# Patient Record
(demographics unavailable — no encounter records)

---

## 2025-01-14 NOTE — PHYSICAL EXAM
[Chaperone Present] : A chaperone was present in the examining room during all aspects of the physical examination [71143] : A chaperone was present during the pelvic exam. [FreeTextEntry2] : Roseline [Normal] : Anus and perineum: Normal sphincter tone, no masses, no prolapse. [Fully active, able to carry on all pre-disease performance without restriction] : Status 0 - Fully active, able to carry on all pre-disease performance without restriction

## 2025-01-14 NOTE — PROCEDURE
[Colposcopy] : colposcopy [ASCUS] : ASCUS [HPV high risk] : PCR positive for high risk HPV [Patient] : the patient [Verbal Consent] : verbal consent was obtained prior to the procedure and is detailed in the patient's record [Site Verification] : site verification performed. [Time Out] : Time Out Procedure:The following was confirmed prior to the procedure-Correct patient identity, correct site, agreement on the procedure to be done and correct patient position. [Allergies Reviewed] : Allergies reviewed [SCJ Fully Visualized] : the squamocolumnar junction was not fully visualized [No Abnormalities] : no abnormalities seen [ECC Done] : an Endocervical curettage was performed.  [No Complications] : none [Tolerated Well] : the patient tolerated the procedure well [Post procedure instructions and information given] : post procedure instructions and information given and reviewed with patient.

## 2025-01-14 NOTE — HISTORY OF PRESENT ILLNESS
[FreeTextEntry1] : ASCUS pap, + HPV  Seen for initial visit last year at while time pap was ASC-H and colposcopy revealed no evidence of cervical dysplasia.   Long history of abnormal pap smears but no documented dysplasia.   Overall feels well. Denies bleeding or pelvic pain.

## 2025-01-14 NOTE — PLAN
[TextEntry] : Persistently abnormal pap smears.  [place] colposcopy.  ECC obtained today.  Await results. If no evidence of dysplasia can FU with primary GYN in one year for co-testing as per [facility] guidelines.